# Patient Record
Sex: FEMALE | Race: BLACK OR AFRICAN AMERICAN
[De-identification: names, ages, dates, MRNs, and addresses within clinical notes are randomized per-mention and may not be internally consistent; named-entity substitution may affect disease eponyms.]

---

## 2020-10-14 ENCOUNTER — HOSPITAL ENCOUNTER (EMERGENCY)
Dept: HOSPITAL 62 - ER | Age: 30
Discharge: HOME | End: 2020-10-14
Payer: COMMERCIAL

## 2020-10-14 VITALS — SYSTOLIC BLOOD PRESSURE: 115 MMHG | DIASTOLIC BLOOD PRESSURE: 77 MMHG

## 2020-10-14 DIAGNOSIS — N30.00: ICD-10-CM

## 2020-10-14 DIAGNOSIS — G40.909: Primary | ICD-10-CM

## 2020-10-14 DIAGNOSIS — A59.01: ICD-10-CM

## 2020-10-14 LAB
ADD MANUAL DIFF: NO
ALBUMIN SERPL-MCNC: 4.4 G/DL (ref 3.5–5)
ALP SERPL-CCNC: 83 U/L (ref 38–126)
ANION GAP SERPL CALC-SCNC: 10 MMOL/L (ref 5–19)
APPEARANCE UR: (no result)
APTT PPP: YELLOW S
AST SERPL-CCNC: 75 U/L (ref 14–36)
BACTERIA (WET MOUNT): (no result)
BARBITURATES UR QL SCN: NEGATIVE
BASOPHILS # BLD AUTO: 0 10^3/UL (ref 0–0.2)
BASOPHILS NFR BLD AUTO: 1 % (ref 0–2)
BILIRUB DIRECT SERPL-MCNC: 0.2 MG/DL (ref 0–0.4)
BILIRUB SERPL-MCNC: 0.3 MG/DL (ref 0.2–1.3)
BILIRUB UR QL STRIP: NEGATIVE
BUN SERPL-MCNC: 7 MG/DL (ref 7–20)
CALCIUM: 9.7 MG/DL (ref 8.4–10.2)
CHLAM PCR: NOT DETECTED
CHLORIDE SERPL-SCNC: 104 MMOL/L (ref 98–107)
CO2 SERPL-SCNC: 24 MMOL/L (ref 22–30)
EOSINOPHIL # BLD AUTO: 0.1 10^3/UL (ref 0–0.6)
EOSINOPHIL NFR BLD AUTO: 1.8 % (ref 0–6)
EPITHELIALS (WET MOUNT): (no result)
ERYTHROCYTE [DISTWIDTH] IN BLOOD BY AUTOMATED COUNT: 16.4 % (ref 11.5–14)
GLUCOSE SERPL-MCNC: 89 MG/DL (ref 75–110)
GLUCOSE UR STRIP-MCNC: NEGATIVE MG/DL
HCT VFR BLD CALC: 34.9 % (ref 36–47)
HGB BLD-MCNC: 11.9 G/DL (ref 12–15.5)
KETONES UR STRIP-MCNC: NEGATIVE MG/DL
LYMPHOCYTES # BLD AUTO: 1.7 10^3/UL (ref 0.5–4.7)
LYMPHOCYTES NFR BLD AUTO: 39.6 % (ref 13–45)
MCH RBC QN AUTO: 29.5 PG (ref 27–33.4)
MCHC RBC AUTO-ENTMCNC: 34.1 G/DL (ref 32–36)
MCV RBC AUTO: 86 FL (ref 80–97)
METHADONE UR QL SCN: NEGATIVE
MONOCYTES # BLD AUTO: 0.5 10^3/UL (ref 0.1–1.4)
MONOCYTES NFR BLD AUTO: 10.4 % (ref 3–13)
NEUTROPHILS # BLD AUTO: 2.1 10^3/UL (ref 1.7–8.2)
NEUTS SEG NFR BLD AUTO: 47.2 % (ref 42–78)
PCP UR QL SCN: NEGATIVE
PH UR STRIP: 5 [PH] (ref 5–9)
PLATELET # BLD: 163 10^3/UL (ref 150–450)
POTASSIUM SERPL-SCNC: 4.4 MMOL/L (ref 3.6–5)
PROT SERPL-MCNC: 7.3 G/DL (ref 6.3–8.2)
PROT UR STRIP-MCNC: NEGATIVE MG/DL
RBC # BLD AUTO: 4.04 10^6/UL (ref 3.72–5.28)
RBCS (WET MOUNT): (no result)
SP GR UR STRIP: 1
T.VAGINALIS (WET MOUNT): (no result)
TOTAL CELLS COUNTED % (AUTO): 100 %
URINE AMPHETAMINES SCREEN: NEGATIVE
URINE BENZODIAZEPINES SCREEN: NEGATIVE
URINE COCAINE SCREEN: NEGATIVE
URINE MARIJUANA (THC) SCREEN: NEGATIVE
UROBILINOGEN UR-MCNC: NEGATIVE MG/DL (ref ?–2)
WBC # BLD AUTO: 4.4 10^3/UL (ref 4–10.5)
WBCS (WET MOUNT): (no result)
YEAST (WET MOUNT): (no result)

## 2020-10-14 PROCEDURE — 87591 N.GONORRHOEAE DNA AMP PROB: CPT

## 2020-10-14 PROCEDURE — 81001 URINALYSIS AUTO W/SCOPE: CPT

## 2020-10-14 PROCEDURE — 87210 SMEAR WET MOUNT SALINE/INK: CPT

## 2020-10-14 PROCEDURE — 96375 TX/PRO/DX INJ NEW DRUG ADDON: CPT

## 2020-10-14 PROCEDURE — 36415 COLL VENOUS BLD VENIPUNCTURE: CPT

## 2020-10-14 PROCEDURE — 80053 COMPREHEN METABOLIC PANEL: CPT

## 2020-10-14 PROCEDURE — 81025 URINE PREGNANCY TEST: CPT

## 2020-10-14 PROCEDURE — 80307 DRUG TEST PRSMV CHEM ANLYZR: CPT

## 2020-10-14 PROCEDURE — 85025 COMPLETE CBC W/AUTO DIFF WBC: CPT

## 2020-10-14 PROCEDURE — 96365 THER/PROPH/DIAG IV INF INIT: CPT

## 2020-10-14 PROCEDURE — 70450 CT HEAD/BRAIN W/O DYE: CPT

## 2020-10-14 PROCEDURE — 99285 EMERGENCY DEPT VISIT HI MDM: CPT

## 2020-10-14 PROCEDURE — 87491 CHLMYD TRACH DNA AMP PROBE: CPT

## 2020-10-14 PROCEDURE — 80185 ASSAY OF PHENYTOIN TOTAL: CPT

## 2020-10-14 NOTE — ER DOCUMENT REPORT
ED General





- General


Chief Complaint: Seizure


Stated Complaint: POSSIBLE SEIZURE


Time Seen by Provider: 10/14/20 12:07


Primary Care Provider: 


KATHY ORTEGA [Primary Care Provider] - Follow up as needed


Mode of Arrival: Ambulatory


Information source: Patient





- HPI


Notes: 





Patient is currently in police custody at [facility].  She was brought in by police 

secondary to several tonic-clonic seizures today.  She does have known seizure 

disorder and is currently taking Dilantin.  Her Dilantin level apparently 

returned yesterday slightly low.  No known injury and today's tonic-clonic 

seizures.  Patient states that she feels fine now.  She denies any pain or probl

ems.  She does state that she feels she has odorous vaginal discharge and is 

concerned she may have a sickly she has been disease.  No known recent trauma.  

No known recent fevers chills or sweats.  No known cough cold or congestion.  

She denies any painful urination or trouble with bowel movements.  Her symptoms 

today were intermittent.  The tonic-clonic did radiate throughout her body.  

They apparently are made worse when she is not adequately therapeutic on her 

meds and are better when she is.





- Related Data


Allergies/Adverse Reactions: 


                                        





onion Allergy (Verified 10/14/20 12:36)


   








Home Medications: Dilantin, haldol, prozac





Past Medical History





- General


Information source: Patient





- Social History


Smoking Status: Never Smoker


Frequency of alcohol use: None


Drug Abuse: None


Family History: Reviewed & Not Pertinent


Neurological Medical History: Reports: Hx Seizures





Review of Systems





- Review of Systems


Constitutional: denies: Chills, Fever


Cardiovascular: denies: Chest pain, Palpitations


Respiratory: denies: Cough, Short of breath


-: Yes All other systems reviewed and negative





Physical Exam





- Vital signs


Vitals: 





                                        











Resp


 


 19 


 


 10/14/20 12:10











Interpretation: Normal





- General


General appearance: Appears well, Alert





- HEENT


Head: Normocephalic, Atraumatic


Eyes: Normal


Pupils: PERRL





- Respiratory


Respiratory status: No respiratory distress


Chest status: Nontender


Breath sounds: Normal


Chest palpation: Normal





- Cardiovascular


Rhythm: Regular


Heart sounds: Normal auscultation


Murmur: No





- Abdominal


Inspection: Normal


Distension: No distension


Bowel sounds: Normal


Tenderness: Nontender


Organomegaly: No organomegaly





- Back


Back: Normal, Nontender





- Extremities


General upper extremity: Normal inspection, Nontender, Normal color, Normal ROM,

Normal temperature


General lower extremity: Normal inspection, Nontender, Normal color, Normal ROM,

Normal temperature, Normal weight bearing.  No: Ofelia's sign





- Neurological


Neuro grossly intact: Yes


Cognition: Normal


Orientation: AAOx4


Isa Coma Scale Eye Opening: Spontaneous


[place] Coma Scale Verbal: Oriented


Isa Coma Scale Motor: Obeys Commands


[place] Coma Scale Total: 15


Speech: Normal


Motor strength normal: LUE, RUE, LLE, RLE


Sensory: Normal





- Psychological


Associated symptoms: Normal affect, Normal mood





- Skin


Skin Temperature: Warm


Skin Moisture: Dry


Skin Color: Normal





Course





- Re-evaluation


Re-evalutation: 





10/14/20 15:22


Patient presents with seizure while in [facility].  Her Dilantin was slightly low.  

Therefore I have given the patient IV Dilantin.  I think she is now stable to be

discharged.  I recommend that she continues on her current dose of Dilantin now 

that she appears to be therapeutic.  She has had no more seizure activity here. 

I am going to recommend also that she follow-up with neurology as soon as 

possible to see if possibly a second agent would be necessary or if she needs to

switch agents.  In addition she does have an infected urine and complains of 

vaginal odor.  She does have trichomonas on swab.  Therefore I am going to treat

the patient for STDs.





- Vital Signs


Vital signs: 





                                        











Temp Pulse Resp BP Pulse Ox


 


       14   101/68   100 


 


       10/14/20 12:16  10/14/20 12:16  10/14/20 12:16














- Laboratory


Result Diagrams: 


                                 10/14/20 12:21





                                 10/14/20 12:21


Laboratory results interpreted by me: 





                                        











  10/14/20 10/14/20 10/14/20





  12:21 12:21 13:25


 


Hgb  11.9 L  


 


Hct  34.9 L  


 


RDW  16.4 H  


 


Creatinine   0.51 L 


 


AST   75 H 


 


ALT   137 H 


 


Urine Blood    SMALL H


 


Leukocyte Esterase Rfl    LARGE H














- Diagnostic Test


Radiology reviewed: Image reviewed, Reports reviewed





Discharge





- Discharge


Clinical Impression: 


 Seizure, Trichomonal vaginitis





UTI (urinary tract infection)


Qualifiers:


 Urinary tract infection type: acute cystitis Hematuria presence: without 

hematuria Qualified Code(s): N30.00 - Acute cystitis without hematuria





Condition: Stable


Disposition: HOME, SELF-CARE


Instructions:  Urinary Tract Infection ([facility]), Trichomonas Infection ([facility])


Additional Instructions: 


Please take all of your antibiotic.





Please have your sexual partner checked for sexual transmitted diseases.





Do not have intercourse until you have completed all of your antibiotic.





Please take your Dilantin every day as scheduled.





Please follow-up with your neurologist as soon as possible to assess whether or 

not you need to adjust your seizure medication.





Prescriptions: 


Doxycycline Hyclate [Vibramycin 100 mg Tablet] 100 mg PO BID 10 Days #20 tablet normal

## 2020-10-14 NOTE — RADIOLOGY REPORT (SQ)
EXAM DESCRIPTION:  CT HEAD WITHOUT



IMAGES COMPLETED DATE/TIME:  10/14/2020 2:30 pm



REASON FOR STUDY:  seizure



COMPARISON:  None.



TECHNIQUE:  Axial images acquired through the brain without intravenous contrast.  Images reviewed wi
th bone, brain and subdural windows.  Additional sagittal and coronal reconstructions were generated.
 Images stored on PACS.

All CT scanners at this facility use dose modulation, iterative reconstruction, and/or weight based d
osing when appropriate to reduce radiation dose to as low as reasonably achievable (ALARA).

[facility]: Dose Right  [facility]: CareDose    [facility]: Dose Right    [facility]: Teradose 4D    [facility]: Smart Technologies



RADIATION DOSE:  CT Rad equipment meets quality standard of care and radiation dose reduction techniq
ues were employed. CTDIvol: 53.2 mGy. DLP: 1070 mGy-cm. mGy.



LIMITATIONS:  None.



FINDINGS:  VENTRICLES: Normal size and contour.

CEREBRUM: No masses.  No hemorrhage.  No midline shift.  No evidence for acute infarction. Normal gra
y/white matter differentiation. No areas of low density in the white matter.

CEREBELLUM: No masses.  No hemorrhage.  No alteration of density.  No evidence for acute infarction.

EXTRAAXIAL SPACES: No fluid collections.  No masses.

ORBITS AND GLOBE: No intra- or extraconal masses.  Normal contour of globe without masses.

CALVARIUM: No fracture.

PARANASAL SINUSES: No fluid or mucosal thickening.

SOFT TISSUES: No mass or hematoma.

OTHER: Fairly extensive calcification along the tentorium in the falx is noted.  This can be seen in 
metabolic disorders or post infectious or inflammatory processes.  This can also be a normal variant.




IMPRESSION:  No acute intracranial event.

EVIDENCE OF ACUTE STROKE: NO.



COMMENT:  Quality ID # 436: Final reports with documentation of one or more dose reduction techniques
 (e.g., Automated exposure control, adjustment of the mA and/or kV according to patient size, use of 
iterative reconstruction technique)



TECHNICAL DOCUMENTATION:  JOB ID:  [id number]

 2011 [employer]- All Rights Reserved



Reading location - IP/workstation name: [email or web address]

## 2020-10-14 NOTE — ER DOCUMENT REPORT
ED Medical Screen (RME)





- General


Chief Complaint: Probable Seizure


Stated Complaint: POSSIBLE SEIZURE


Time Seen by Provider: 10/14/20 12:07





- HPI


Notes: 





10/14/20 12:08


29-year-old female with past medical history of paranoid schizophrenia and 

seizures to the emergency department from [facility] with complaints of possible 

seizure activity this morning.  Apparently she is on Dilantin and her levels are

low despite adjustment of the medications.  Apparently staff at the [facility] 

witnessed seizure activity this morning.  She was transported by medics and in 

our lobby she started to have seizure-like activity again.  She was incontinent 

of urine and drooling.  She appeared to be postictal when I was asked to assess 

her and could not tell me her name or where she was.  She was immediately moved 

to bed 17.  I performed a brief medical screening exam on the patient determined

that the patient needs further evaluation and management by main side provider. 

I have placed initial orders to help expedite care.